# Patient Record
Sex: FEMALE | Race: BLACK OR AFRICAN AMERICAN | URBAN - METROPOLITAN AREA
[De-identification: names, ages, dates, MRNs, and addresses within clinical notes are randomized per-mention and may not be internally consistent; named-entity substitution may affect disease eponyms.]

---

## 2021-08-24 ENCOUNTER — OFFICE VISIT (OUTPATIENT)
Dept: URBAN - METROPOLITAN AREA CLINIC 40 | Facility: CLINIC | Age: 61
End: 2021-08-24

## 2021-10-12 ENCOUNTER — WEB ENCOUNTER (OUTPATIENT)
Dept: URBAN - METROPOLITAN AREA CLINIC 40 | Facility: CLINIC | Age: 61
End: 2021-10-12

## 2021-10-12 ENCOUNTER — OFFICE VISIT (OUTPATIENT)
Dept: URBAN - METROPOLITAN AREA CLINIC 40 | Facility: CLINIC | Age: 61
End: 2021-10-12
Payer: OTHER GOVERNMENT

## 2021-10-12 ENCOUNTER — LAB OUTSIDE AN ENCOUNTER (OUTPATIENT)
Dept: URBAN - METROPOLITAN AREA CLINIC 40 | Facility: CLINIC | Age: 61
End: 2021-10-12

## 2021-10-12 DIAGNOSIS — Z12.11 COLON CANCER SCREENING: ICD-10-CM

## 2021-10-12 DIAGNOSIS — B17.10 HEPATITIS C: ICD-10-CM

## 2021-10-12 PROCEDURE — 99204 OFFICE O/P NEW MOD 45 MIN: CPT | Performed by: INTERNAL MEDICINE

## 2021-10-12 RX ORDER — POLYETHYLENE GLYCOL 3350, SODIUM SULFATE, SODIUM CHLORIDE, POTASSIUM CHLORIDE, ASCORBIC ACID, SODIUM ASCORBATE 140-9-5.2G
AS DIRECTED KIT ORAL ONCE
Qty: 1 | Refills: 0 | OUTPATIENT
Start: 2021-10-12 | End: 2021-10-13

## 2021-10-12 NOTE — HPI-TODAY'S VISIT:
Ms. Yang is a 61-year-old black female seen as a new patient evaluation at the request of Dr. Zhang for hepatitis C. Patient states she was diagnosed with hepatitis C back in 2005 after LFTs were noted to be elevated. Patient was seen by a liver specialist and offered interferon after having a liver biopsy but decided against treatment. Her risk factor for hepatitis C was a blood transfusion in 1984. She denies any IV drug abuse. She been asymptomatic from hepatitis C. She denies abdominal pain, jaundice, nausea or fatigue. She had ultrasound in May of this year that showed a normal liver. She is status post cholecystectomy. She has never had a screening colonoscopy. She is moving her bowels regularly daily two times a day. There is no blood in the stool or melena. Blood work from her PCP in June was reviewed which showed a viral load of form 0.7 million. Genotype was noted to be 1B.

## 2021-10-13 LAB
A/G RATIO: 2.1
ALBUMIN: 5.1
ALKALINE PHOSPHATASE: 66
ALT (SGPT): 77
AST (SGOT): 73
BASO (ABSOLUTE): 0.1
BASOS: 1
BILIRUBIN, TOTAL: 0.7
BUN/CREATININE RATIO: 9
BUN: 6
CALCIUM: 10.4
CARBON DIOXIDE, TOTAL: 28
CHLORIDE: 104
CREATININE: 0.69
EGFR IF AFRICN AM: 110
EGFR IF NONAFRICN AM: 96
EOS (ABSOLUTE): 0.1
EOS: 2
GLOBULIN, TOTAL: 2.4
GLUCOSE: 89
HBSAG SCREEN: NEGATIVE
HEMATOCRIT: 43.8
HEMATOLOGY COMMENTS:: (no result)
HEMOGLOBIN: 14.1
HEP A AB, TOTAL: NEGATIVE
HEP B CORE AB, TOT: NEGATIVE
HEPATITIS B SURF AB QUANT: <3.1
HIV SCREEN 4TH GENERATION WRFX: NON REACTIVE
IMMATURE CELLS: (no result)
IMMATURE GRANS (ABS): 0
IMMATURE GRANULOCYTES: 0
LYMPHS (ABSOLUTE): 2.4
LYMPHS: 45
MCH: 28.7
MCHC: 32.2
MCV: 89
MONOCYTES(ABSOLUTE): 0.5
MONOCYTES: 9
NEUTROPHILS (ABSOLUTE): 2.3
NEUTROPHILS: 43
NRBC: (no result)
PLATELETS: 209
POTASSIUM: 5.1
PROTEIN, TOTAL: 7.5
RBC: 4.91
RDW: 12.6
SODIUM: 143
WBC: 5.4

## 2021-11-03 ENCOUNTER — OFFICE VISIT (OUTPATIENT)
Dept: URBAN - METROPOLITAN AREA SURGERY CENTER 30 | Facility: SURGERY CENTER | Age: 61
End: 2021-11-03
Payer: OTHER GOVERNMENT

## 2021-11-03 ENCOUNTER — CLAIMS CREATED FROM THE CLAIM WINDOW (OUTPATIENT)
Dept: URBAN - METROPOLITAN AREA CLINIC 4 | Facility: CLINIC | Age: 61
End: 2021-11-03
Payer: OTHER GOVERNMENT

## 2021-11-03 DIAGNOSIS — D12.5 BENIGN NEOPLASM OF SIGMOID COLON: ICD-10-CM

## 2021-11-03 DIAGNOSIS — K63.89 POLYP, HYPERPLASTIC: ICD-10-CM

## 2021-11-03 DIAGNOSIS — K63.89 BACTERIAL OVERGROWTH SYNDROME: ICD-10-CM

## 2021-11-03 DIAGNOSIS — D12.3 ADENOMA OF TRANSVERSE COLON: ICD-10-CM

## 2021-11-03 DIAGNOSIS — D12.5 ADENOMA OF SIGMOID COLON: ICD-10-CM

## 2021-11-03 DIAGNOSIS — D12.3 BENIGN NEOPLASM OF TRANSVERSE COLON: ICD-10-CM

## 2021-11-03 PROCEDURE — 45385 COLONOSCOPY W/LESION REMOVAL: CPT | Performed by: INTERNAL MEDICINE

## 2021-11-03 PROCEDURE — G8907 PT DOC NO EVENTS ON DISCHARG: HCPCS | Performed by: INTERNAL MEDICINE

## 2021-11-03 PROCEDURE — 88305 TISSUE EXAM BY PATHOLOGIST: CPT | Performed by: PATHOLOGY

## 2021-11-18 ENCOUNTER — OFFICE VISIT (OUTPATIENT)
Dept: URBAN - METROPOLITAN AREA CLINIC 40 | Facility: CLINIC | Age: 61
End: 2021-11-18
Payer: OTHER GOVERNMENT

## 2021-11-18 VITALS
BODY MASS INDEX: 30.45 KG/M2 | HEART RATE: 76 BPM | DIASTOLIC BLOOD PRESSURE: 92 MMHG | TEMPERATURE: 98.1 F | WEIGHT: 194 LBS | HEIGHT: 67 IN | SYSTOLIC BLOOD PRESSURE: 136 MMHG

## 2021-11-18 DIAGNOSIS — R74.01 ELEVATED TRANSAMINASE LEVEL: ICD-10-CM

## 2021-11-18 DIAGNOSIS — B18.2 CHRONIC HEPATITIS C: ICD-10-CM

## 2021-11-18 DIAGNOSIS — D12.6 ADENOMATOUS COLON POLYPS: ICD-10-CM

## 2021-11-18 PROBLEM — 50711007 HEPATITIS C: Status: ACTIVE | Noted: 2021-10-12

## 2021-11-18 PROCEDURE — 99213 OFFICE O/P EST LOW 20 MIN: CPT | Performed by: INTERNAL MEDICINE

## 2021-11-18 RX ORDER — VELPATASVIR AND SOFOSBUVIR 100; 400 MG/1; MG/1
1 TABLET TABLET, FILM COATED ORAL ONCE A DAY
Qty: 28 TABLET | Refills: 2 | OUTPATIENT
Start: 2021-11-18 | End: 2022-02-10

## 2021-11-18 NOTE — HPI-TODAY'S VISIT:
Ms. Yang is a 61-year-old black female seen 10/12/21 by Dr. Smith as a new patient evaluation at the request of Dr. Zhang for hepatitis C. Patient states she was diagnosed with hepatitis C back in 2005 after LFTs were noted to be elevated. Patient was seen by a liver specialist and offered interferon after having a liver biopsy but decided against treatment. Her risk factor for hepatitis C was a blood transfusion in 1984. She denies any IV drug abuse. She been asymptomatic from hepatitis C. She denies abdominal pain, jaundice, nausea or fatigue. She had ultrasound in May of this year that showed a normal liver. She is status post cholecystectomy.  She is moving her bowels regularly daily two times a day. There is no blood in the stool or melena. Blood work from her PCP in June was reviewed which showed a viral load of form 0.7 million. Genotype was noted to be 1B. She had a colonoscopy with Dr. Smith on November 3, 2021 which is noted to have two sessile polyps of 2 to 7 mm in size removed from the splenic flexure and transverse colon respectively.  There was also a 25 mm polyp that was noted in the rectosigmoid colon and this was semipedunculated.  It was removed with hot snare.  To close defect after polypectomy, 1 hemostatic clip was successfully placed.  There was no bleeding following the procedure.  Per path, transverse colon polyp with lymphoid aggregates.  Tubular adenomata splenic flexure.  The rectosigmoid colon polyp was consistent with tubulovillous adenoma.  Given these findings, it was recommended she have surveillance colonoscopy in 3 years. She did have labs which included a hepatitis B surface antibody, positive with low immunity. HIV negative. CMP with mildly elevated AST of 73, ALT 77 with normal total bilirubin and alkaline phosphatase. CBC was normal. Hepatitis A antibody negative, consistent with no immunity. Hepatitis B surface antigen and core antibody were both negative.

## 2021-12-27 ENCOUNTER — TELEPHONE ENCOUNTER (OUTPATIENT)
Dept: URBAN - METROPOLITAN AREA CLINIC 40 | Facility: CLINIC | Age: 61
End: 2021-12-27

## 2021-12-27 RX ORDER — VELPATASVIR AND SOFOSBUVIR 100; 400 MG/1; MG/1
1 TABLET TABLET, FILM COATED ORAL ONCE A DAY
Qty: 28 TABLET | Refills: 2
Start: 2021-11-18 | End: 2022-03-21

## 2022-02-11 ENCOUNTER — OFFICE VISIT (OUTPATIENT)
Dept: URBAN - METROPOLITAN AREA CLINIC 40 | Facility: CLINIC | Age: 62
End: 2022-02-11

## 2022-02-11 RX ORDER — VELPATASVIR AND SOFOSBUVIR 100; 400 MG/1; MG/1
1 TABLET TABLET, FILM COATED ORAL ONCE A DAY
Qty: 28 TABLET | Refills: 2 | Status: ACTIVE | COMMUNITY
Start: 2021-11-18 | End: 2022-03-21

## 2022-03-22 ENCOUNTER — OFFICE VISIT (OUTPATIENT)
Dept: URBAN - METROPOLITAN AREA CLINIC 40 | Facility: CLINIC | Age: 62
End: 2022-03-22
Payer: OTHER GOVERNMENT

## 2022-03-22 VITALS
SYSTOLIC BLOOD PRESSURE: 126 MMHG | WEIGHT: 195 LBS | HEART RATE: 80 BPM | DIASTOLIC BLOOD PRESSURE: 76 MMHG | BODY MASS INDEX: 30.61 KG/M2 | TEMPERATURE: 98 F | HEIGHT: 67 IN

## 2022-03-22 DIAGNOSIS — D12.6 TUBULOVILLOUS ADENOMA OF COLON: ICD-10-CM

## 2022-03-22 DIAGNOSIS — B18.2 CHRONIC HEPATITIS C: ICD-10-CM

## 2022-03-22 DIAGNOSIS — R74.8 ELEVATED LIVER ENZYMES: ICD-10-CM

## 2022-03-22 DIAGNOSIS — Z90.49 HISTORY OF CHOLECYSTECTOMY: ICD-10-CM

## 2022-03-22 PROCEDURE — 99213 OFFICE O/P EST LOW 20 MIN: CPT | Performed by: PHYSICIAN ASSISTANT

## 2022-03-22 NOTE — HPI-TODAY'S VISIT:
Ms. Mejias is a 61-year-old Black female seen 11/18/21 at the request of Dr. Zhang for hepatitis C. Patient states she was diagnosed with hepatitis C back in 2005 after LFTs were noted to be elevated. Patient was seen by a liver specialist and offered interferon after having a liver biopsy but decided against treatment. Her risk factor for hepatitis C was a blood transfusion in 1984. She denies any IV drug abuse. She been asymptomatic from hepatitis C. She denies abdominal pain, jaundice, nausea or fatigue. She had ultrasound in May 2021 that showed a normal liver. She is status post cholecystectomy.  She is moving her bowels regularly daily two times a day. There is no blood in the stool or melena. Blood work from her PCP in June was reviewed which showed a viral load of form 0.7 million. Genotype was noted to be 1B. She had a colonoscopy with Dr. Smith on November 3, 2021 which is noted to have two sessile polyps of 2 to 7 mm in size removed from the splenic flexure and transverse colon respectively.  There was also a 25 mm polyp that was noted in the rectosigmoid colon and this was semipedunculated.  It was removed with hot snare.  To close defect after polypectomy, one hemostatic clip was successfully placed.  There was no bleeding following the procedure.  Per path, transverse colon polyp with lymphoid aggregates.  Tubular adenoma at the splenic flexure.  The rectosigmoid colon polyp was consistent with tubulovillous adenoma.  Given these findings, it was recommended she have surveillance colonoscopy in 3 years. She did have labs which included a hepatitis B surface antibody, positive with low immunity. HIV negative. CMP with mildly elevated AST of 73, ALT 77 with normal total bilirubin and alkaline phosphatase. CBC was normal. Hepatitis A antibody negative, consistent with no immunity. Hepatitis B surface antigen and core antibody were both negative. Since January of this year, she has been on Epclusa daily and has had no significant adverse reactions to medication.  On April 2, she will complete DAA therapy.  States that she has had occasional bruising which was easy for years even before taking therapy for hepatitis C.  No known history of cirrhosis.  Denies any rectal bleeding.  Bowel habits are unchanged.  Weight fairly stable.  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  Travels often.  No new complaints.  She did not do recommended 4-week labs before today's visit.

## 2022-03-24 LAB
A/G RATIO: 2.2
ALBUMIN: 4.8
ALKALINE PHOSPHATASE: 56
ALT (SGPT): 11
AST (SGOT): 12
BILIRUBIN, TOTAL: 0.4
BUN/CREATININE RATIO: 14
BUN: 11
CALCIUM: 9.8
CARBON DIOXIDE, TOTAL: 23
CHLORIDE: 103
CREATININE: 0.78
EGFR: 87
GLOBULIN, TOTAL: 2.2
GLUCOSE: 119
HCV LOG10: (no result)
HEPATITIS C QUANTITATION: (no result)
POTASSIUM: 3.5
PROTEIN, TOTAL: 7
SODIUM: 143
TEST INFORMATION:: (no result)

## 2022-04-08 ENCOUNTER — TELEPHONE ENCOUNTER (OUTPATIENT)
Dept: URBAN - METROPOLITAN AREA CLINIC 74 | Facility: CLINIC | Age: 62
End: 2022-04-08

## 2022-07-20 ENCOUNTER — OFFICE VISIT (OUTPATIENT)
Dept: URBAN - METROPOLITAN AREA CLINIC 40 | Facility: CLINIC | Age: 62
End: 2022-07-20
Payer: OTHER GOVERNMENT

## 2022-07-20 VITALS
HEIGHT: 67 IN | HEART RATE: 78 BPM | SYSTOLIC BLOOD PRESSURE: 120 MMHG | BODY MASS INDEX: 32.8 KG/M2 | TEMPERATURE: 98.2 F | WEIGHT: 209 LBS | DIASTOLIC BLOOD PRESSURE: 82 MMHG

## 2022-07-20 DIAGNOSIS — Z86.19 HISTORY OF HEPATITIS C: ICD-10-CM

## 2022-07-20 DIAGNOSIS — Z90.49 HISTORY OF CHOLECYSTECTOMY: ICD-10-CM

## 2022-07-20 DIAGNOSIS — R74.8 ELEVATED LIVER ENZYMES: ICD-10-CM

## 2022-07-20 DIAGNOSIS — D12.5 ADENOMA OF SIGMOID COLON: ICD-10-CM

## 2022-07-20 PROBLEM — 128302006 CHRONIC HEPATITIS C: Status: ACTIVE | Noted: 2021-11-18

## 2022-07-20 PROBLEM — 428882003 HISTORY OF CHOLECYSTECTOMY: Status: ACTIVE | Noted: 2022-03-22

## 2022-07-20 PROCEDURE — 99213 OFFICE O/P EST LOW 20 MIN: CPT | Performed by: PHYSICIAN ASSISTANT

## 2022-07-20 NOTE — HPI-TODAY'S VISIT:
Ms. Mejias is a 62-year-old Black female seen 3/22/22 at the request of Dr. Zhang for hepatitis C. Patient states she was diagnosed with hepatitis C back in 2005 after LFTs were noted to be elevated. Patient was seen by a liver specialist and offered interferon after having a liver biopsy but decided against treatment. Her risk factor for hepatitis C was a blood transfusion in 1984. She denies any IV drug abuse. She been asymptomatic from hepatitis C. She denies abdominal pain, jaundice, nausea or fatigue. She had ultrasound in May 2021 that showed a normal liver. She is status post cholecystectomy.  She is moving her bowels regularly daily two times a day. There is no blood in the stool or melena. Blood work from her PCP in June was reviewed which showed a viral load of form 0.7 million. Genotype was noted to be 1B. She had a colonoscopy with Dr. Smith on November 3, 2021 which is noted to have two sessile polyps of 2 to 7 mm in size removed from the splenic flexure and transverse colon respectively.  There was also a 25 mm polyp that was noted in the rectosigmoid colon and this was semipedunculated.  It was removed with hot snare.  To close defect after polypectomy, one hemostatic clip was successfully placed.  There was no bleeding following the procedure.  Per path, transverse colon polyp with lymphoid aggregates.  Tubular adenoma at the splenic flexure.  The rectosigmoid colon polyp was consistent with tubulovillous adenoma.  Given these findings, it was recommended she have surveillance colonoscopy in 3 years. She did have labs which included a hepatitis B surface antibody, positive with low immunity. HIV negative. CMP with mildly elevated AST of 73, ALT 77 with normal total bilirubin and alkaline phosphatase. CBC was normal. Hepatitis A antibody negative, consistent with no immunity. Hepatitis B surface antigen and core antibody were both negative. Since January of this year, she has been on Epclusa daily and has had no significant adverse reactions to medication.  On April 2, she will complete DAA therapy.  States that she has had occasional bruising which was easy for years even before taking therapy for hepatitis C.  No known history of cirrhosis.  Denies any rectal bleeding.  Bowel habits are unchanged.  Weight fairly stable.  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  Travels often.   Normal RUQ US in April. HCV not detected with labs 3/22/22. She is needing labs today, to confirm SVR. She has no complaints today.

## 2022-07-23 LAB
A/G RATIO: 1.8
ALBUMIN: 4.6
ALKALINE PHOSPHATASE: 58
ALT (SGPT): 12
AST (SGOT): 18
BILIRUBIN, TOTAL: 0.5
BUN/CREATININE RATIO: (no result)
BUN: 12
CALCIUM: 10
CARBON DIOXIDE, TOTAL: 29
CHLORIDE: 105
CREATININE: 0.72
EGFR: 96
GLOBULIN, TOTAL: 2.6
GLUCOSE: 92
HCV RNA, QUANTITATIVE: <1.18
HCV RNA, QUANTITATIVE: <15
HEMATOCRIT: 41.2
HEMOGLOBIN: 13.3
MCH: 27.5
MCHC: 32.3
MCV: 85.1
MPV: 11.3
PLATELET COUNT: 182
POTASSIUM: 4.7
PROTEIN, TOTAL: 7.2
RDW: 13.3
RED BLOOD CELL COUNT: 4.84
SODIUM: 143
WHITE BLOOD CELL COUNT: 5.6

## 2022-08-30 ENCOUNTER — OFFICE VISIT (OUTPATIENT)
Dept: URBAN - METROPOLITAN AREA CLINIC 40 | Facility: CLINIC | Age: 62
End: 2022-08-30

## 2023-07-19 ENCOUNTER — OFFICE VISIT (OUTPATIENT)
Dept: URBAN - METROPOLITAN AREA CLINIC 40 | Facility: CLINIC | Age: 63
End: 2023-07-19
Payer: OTHER GOVERNMENT

## 2023-07-19 ENCOUNTER — DASHBOARD ENCOUNTERS (OUTPATIENT)
Age: 63
End: 2023-07-19

## 2023-07-19 ENCOUNTER — OFFICE VISIT (OUTPATIENT)
Dept: URBAN - METROPOLITAN AREA CLINIC 40 | Facility: CLINIC | Age: 63
End: 2023-07-19

## 2023-07-19 VITALS
WEIGHT: 223 LBS | BODY MASS INDEX: 35 KG/M2 | SYSTOLIC BLOOD PRESSURE: 142 MMHG | HEIGHT: 67 IN | HEART RATE: 74 BPM | DIASTOLIC BLOOD PRESSURE: 94 MMHG

## 2023-07-19 DIAGNOSIS — R74.8 ELEVATED LIVER ENZYMES: ICD-10-CM

## 2023-07-19 DIAGNOSIS — D12.6 ADENOMATOUS COLON POLYPS: ICD-10-CM

## 2023-07-19 DIAGNOSIS — Z90.49 HISTORY OF CHOLECYSTECTOMY: ICD-10-CM

## 2023-07-19 DIAGNOSIS — Z86.19 HISTORY OF HEPATITIS C: ICD-10-CM

## 2023-07-19 PROCEDURE — 99214 OFFICE O/P EST MOD 30 MIN: CPT | Performed by: INTERNAL MEDICINE

## 2023-07-19 NOTE — HPI-TODAY'S VISIT:
Ms. Mejias is a 63-year-old Black female seen 7/20/22 at the request of Dr. Zhang for hepatitis C. Patient states she was diagnosed with hepatitis C back in 2005 after LFTs were noted to be elevated. Patient was seen by a liver specialist and offered interferon after having a liver biopsy but decided against treatment. Her risk factor for hepatitis C was a blood transfusion in 1984. She denies any IV drug abuse. She been asymptomatic from hepatitis C. She denies abdominal pain, jaundice, nausea or fatigue. She had ultrasound in May 2021 that showed a normal liver. She is status post cholecystectomy.  She is moving her bowels regularly daily two times a day. There is no blood in the stool or melena. Blood work from her PCP in June was reviewed which showed a viral load of form 0.7 million. Genotype was noted to be 1B. She had a colonoscopy with Dr. Smith on November 3, 2021 which is noted to have two sessile polyps of 2 to 7 mm in size removed from the splenic flexure and transverse colon respectively.  There was also a 25 mm polyp that was noted in the rectosigmoid colon and this was semipedunculated.  It was removed with hot snare.  To close defect after polypectomy, one hemostatic clip was successfully placed.  There was no bleeding following the procedure.  Per path, transverse colon polyp with lymphoid aggregates.  Tubular adenoma at the splenic flexure.  The rectosigmoid colon polyp was consistent with tubulovillous adenoma.  Given these findings, it was recommended she have surveillance colonoscopy in 3 years. She did have labs which included a hepatitis B surface antibody, positive with low immunity. HIV negative. CMP with mildly elevated AST of 73, ALT 77 with normal total bilirubin and alkaline phosphatase. CBC was normal. Hepatitis A antibody negative, consistent with no immunity. Hepatitis B surface antigen and core antibody were both negative. Since January of this year, she has been on Epclusa daily and has had no significant adverse reactions to medication.  On April 2, she will complete DAA therapy.  States that she has had occasional bruising which was easy for years even before taking therapy for hepatitis C.  No known history of cirrhosis.  Denies any rectal bleeding.  Bowel habits are unchanged.  Weight fairly stable.  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  Travels often.   Normal RUQ US in April 2022. HCV not detected with labs 3/2022 or 7/2022. CBC and CMP were also normal. The patient is doing well from a gastrointestinal standpoint and has no complaints today.  Returns to the office to discuss SVR labs after therapy for hepatitis C.  I do see notes from her primary medical doctor and reports that patient has had some uncontrolled hypertension but has not been compliant with monitoring blood pressure at home.  She admits this today.  She also admits that she was prescribed antihypertensive but has not been taking it after doing research and concern for side effects.  She also does honest and that she has had some hesitation with beginning medication as her mother suffered illness and death with a history of polypharmacy. Continues to exercise, eat healthier with weight loss noted.

## 2023-07-21 LAB
HCV RNA, QUANTITATIVE: <1.18
HCV RNA, QUANTITATIVE: <15

## 2024-07-19 ENCOUNTER — OFFICE VISIT (OUTPATIENT)
Dept: URBAN - METROPOLITAN AREA CLINIC 40 | Facility: CLINIC | Age: 64
End: 2024-07-19

## 2024-07-30 ENCOUNTER — OFFICE VISIT (OUTPATIENT)
Dept: URBAN - METROPOLITAN AREA CLINIC 40 | Facility: CLINIC | Age: 64
End: 2024-07-30

## 2024-08-21 ENCOUNTER — LAB OUTSIDE AN ENCOUNTER (OUTPATIENT)
Dept: URBAN - METROPOLITAN AREA CLINIC 40 | Facility: CLINIC | Age: 64
End: 2024-08-21

## 2024-08-21 ENCOUNTER — OFFICE VISIT (OUTPATIENT)
Dept: URBAN - METROPOLITAN AREA CLINIC 40 | Facility: CLINIC | Age: 64
End: 2024-08-21
Payer: OTHER GOVERNMENT

## 2024-08-21 VITALS
WEIGHT: 231 LBS | OXYGEN SATURATION: 99 % | HEART RATE: 82 BPM | BODY MASS INDEX: 36.26 KG/M2 | SYSTOLIC BLOOD PRESSURE: 132 MMHG | TEMPERATURE: 97.8 F | DIASTOLIC BLOOD PRESSURE: 98 MMHG | HEIGHT: 67 IN

## 2024-08-21 DIAGNOSIS — Z86.010 PERSONAL HISTORY OF COLON POLYPS: ICD-10-CM

## 2024-08-21 DIAGNOSIS — Z86.19 HISTORY OF HEPATITIS C: ICD-10-CM

## 2024-08-21 DIAGNOSIS — Z90.49 HISTORY OF CHOLECYSTECTOMY: ICD-10-CM

## 2024-08-21 PROCEDURE — 99214 OFFICE O/P EST MOD 30 MIN: CPT | Performed by: PHYSICIAN ASSISTANT

## 2024-08-21 NOTE — HPI-TODAY'S VISIT:
Ms. Mejias is a 64-year-old Black female seen 7/19/23,for follow up of chronic hepatitis C, GTIb. Patient states she was diagnosed with hepatitis C back in 2005 after LFTs were noted to be elevated. Patient was seen by a liver specialist and offered interferon after having a liver biopsy but decided against treatment. Her risk factor for hepatitis C was a blood transfusion in 1984. She denies any IV drug abuse. She been asymptomatic from hepatitis C. She denies abdominal pain, jaundice, nausea or fatigue. She had ultrasound in May 2021 that showed a normal liver. She is status post cholecystectomy.    She is moving her bowels regularly daily two times a day. There is no blood in the stool or melena. She had a colonoscopy with Dr. Smith on November 3, 2021 which is noted to have two sessile polyps of 2 to 7 mm in size removed from the splenic flexure and transverse colon respectively.  There was also a 25 mm polyp that was noted in the rectosigmoid colon and this was semipedunculated.  It was removed with hot snare.  To close defect after polypectomy, one hemostatic clip was successfully placed.  There was no bleeding following the procedure.  Per path, transverse colon polyp with lymphoid aggregates.  Tubular adenoma at the splenic flexure.  The rectosigmoid colon polyp was consistent with tubulovillous adenoma.  Given these findings, it was recommended she have surveillance colonoscopy in 3 years. She did have labs which included a hepatitis B surface antibody, positive with low immunity. HIV negative. Hepatitis A antibody negative, consistent with no immunity. Hepatitis B surface antigen and core antibody were both negative. She completed Epclusa in April 2022..  No known history of cirrhosis. Normal RUQ US in April 2022. HCV not detected with labs 3/2022 or 7/2022. SVR confirmed per labs 7/21/23. Patient overall doing well.  She is trying to eat healthier to lose weight.  Admits she gained significant amount of weight over the summer due to travel.

## 2024-12-20 ENCOUNTER — CLAIMS CREATED FROM THE CLAIM WINDOW (OUTPATIENT)
Dept: URBAN - METROPOLITAN AREA CLINIC 4 | Facility: CLINIC | Age: 64
End: 2024-12-20
Payer: OTHER GOVERNMENT

## 2024-12-20 ENCOUNTER — CLAIMS CREATED FROM THE CLAIM WINDOW (OUTPATIENT)
Dept: URBAN - METROPOLITAN AREA SURGERY CENTER 30 | Facility: SURGERY CENTER | Age: 64
End: 2024-12-20

## 2024-12-20 DIAGNOSIS — D12.3 BENIGN NEOPLASM OF TRANSVERSE COLON: ICD-10-CM

## 2024-12-20 DIAGNOSIS — D12.0 BENIGN NEOPLASM OF CECUM: ICD-10-CM

## 2024-12-20 DIAGNOSIS — Z86.0100 PERSONAL HISTORY OF COLONIC POLYPS: ICD-10-CM

## 2024-12-20 PROCEDURE — 88305 TISSUE EXAM BY PATHOLOGIST: CPT | Performed by: PATHOLOGY

## 2025-01-21 ENCOUNTER — OFFICE VISIT (OUTPATIENT)
Dept: URBAN - METROPOLITAN AREA CLINIC 40 | Facility: CLINIC | Age: 65
End: 2025-01-21
Payer: OTHER GOVERNMENT

## 2025-01-21 VITALS
HEART RATE: 88 BPM | SYSTOLIC BLOOD PRESSURE: 138 MMHG | DIASTOLIC BLOOD PRESSURE: 78 MMHG | WEIGHT: 230.4 LBS | HEIGHT: 67 IN | BODY MASS INDEX: 36.16 KG/M2 | TEMPERATURE: 98.8 F

## 2025-01-21 DIAGNOSIS — D12.0 BENIGN NEOPLASM OF CECUM: ICD-10-CM

## 2025-01-21 DIAGNOSIS — K57.30 ACQUIRED DIVERTICULOSIS OF COLON: ICD-10-CM

## 2025-01-21 PROCEDURE — 99213 OFFICE O/P EST LOW 20 MIN: CPT | Performed by: INTERNAL MEDICINE

## 2025-01-21 NOTE — HPI-TODAY'S VISIT:
Ms. Mejias presents to clinic for follow-up.  She was seen by the physician assistant in August in need of a surveillance colonoscopy due to her history of colon polyps.  Recall patient has a history of hepatitis C status posttreatment with occlusive with SVR documented in 2022.  Patient had her colonoscopy on December 20, 2024.  This was complete to the cecum.  She had 2 polyps 1 was a sessile serrated adenoma and the other was a tubular adenoma.  She also had left-sided diverticular disease and internal hemorrhoids.  Patient states she is doing well.  She was having some issues with constipation but since switching her diet to a plant-based 1 she has done better.